# Patient Record
Sex: FEMALE | Race: WHITE | NOT HISPANIC OR LATINO | Employment: FULL TIME | URBAN - METROPOLITAN AREA
[De-identification: names, ages, dates, MRNs, and addresses within clinical notes are randomized per-mention and may not be internally consistent; named-entity substitution may affect disease eponyms.]

---

## 2024-03-04 NOTE — PROGRESS NOTES
Lani Musa female   1940 83 y.o.   [id number]      Chief Complaint  High risk surveillance care, annual mammogram and exam    History Of Present Illness  Sister Lani Musa is a very pleasant 83 year old  woman [facility]. 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 She has additional remote history of multiple breast biopsies, benign. She declined endocrine therapy. She presents today for annual mammogram and exam. She denies any new masses or lumps. She is currently on 4L of oxygen via Nasal Canula due to pulmonary fibrosis and blocked cardiac valve. She states she has to remain on the oxygen indefinitely.      BREAST IMAGING: 3/7/2023 Bilateral screening mammogram, indicates BI-RADS Category 2.      FEMALE HISTORY: menarche age 12, nulliparous, no OCP's, natural menopause age 47, no HRT, scattered fibroglandular tissue     FAMILY CANCER HISTORY:  None      Surgical History  She has a past surgical history that includes Back surgery (09/05/2013); Colonoscopy (09/05/2013); Tonsillectomy (09/05/2013); Laparoscopy diagnostic / biopsy / aspiration / lysis (09/05/2013); and Breast biopsy (03/11/2014).     Social History  She reports that she has never smoked. She has never used smokeless tobacco. No history on file for alcohol use and drug use.    Family History  No family history on file.     Allergies  Aspirin, Nsaids (non-steroidal anti-inflammatory drug), Pyrazolones, and Salicylates    Medications  Current Outpatient Medications   Medication Instructions    acetaminophen 500 mg powder in packet 2 tablets, oral, Nightly    cholecalciferol (VITAMIN D-3) 1,000 Units, oral, Daily RT    cyanocobalamin (VITAMIN B-12) 1,000 mcg, oral, Daily    digoxin (LANOXIN) 125 mcg, oral, Daily     empagliflozin (Jardiance) 10 mg 1 tablet, oral, Daily with breakfast    ibandronate (BONIVA) 150 mg, oral, Every 30 days    ipratropium (Atrovent) 21 mcg (0.03 %) nasal spray 2 sprays, nasal, 3 times daily    lisinopril 5 mg, oral, Daily RT    metoprolol tartrate (LOPRESSOR) 50 mg, oral, 2 times daily    risedronate (ACTONEL) 150 mg, oral    rivaroxaban (XARELTO) 20 mg, oral, Daily with evening meal    thiamine (VITAMIN B-1) 100 mg, oral, Daily RT    Tyvaso DPI 64 mcg, inhalation, 4 times daily         REVIEW OF SYSTEMS    Constitutional:  Negative for appetite change, fatigue, fever and unexpected weight change.   HENT:  Negative for ear pain, hearing loss, nosebleeds, sore throat and trouble swallowing.    Eyes:  Negative for discharge, itching and visual disturbance.   Respiratory:  Negative for cough, chest tightness and shortness of breath.    Cardiovascular:  Negative for chest pain, palpitations and leg swelling.   Breast: as indicated in HPI  Gastrointestinal:  Negative for abdominal pain, constipation, diarrhea and nausea.   Endocrine: Negative for cold intolerance and heat intolerance.   Genitourinary:  Negative for dysuria, frequency, hematuria, pelvic pain and vaginal bleeding.   Musculoskeletal:  Negative for arthralgias, back pain, gait problem, joint swelling and myalgias.   Skin:  Negative for color change and rash.   Allergic/Immunologic: Negative for environmental allergies and food allergies.   Neurological:  Negative for dizziness, tremors, speech difficulty, weakness, numbness and headaches.   Hematological:  Does not bruise/bleed easily.   Psychiatric/Behavioral:  Negative for agitation, dysphoric mood and sleep disturbance. The patient is not nervous/anxious.         Past Medical History  She has a past medical history of Personal history of in-situ neoplasm of breast (10/23/2015) and Personal history of other diseases of the circulatory system.     Physical Exam  Patient is alert and oriented  x3 and in a relaxed and appropriate mood. Her gait is steady and hand grasps are equal. Sclera is clear. The breasts are nearly symmetrical with ptosis. The tissue is soft without palpable abnormalities, discrete nodules or masses. Both breasts have well-healed excisional biopsy incisions. The skin is dry and flaky. The nipples appear normal. There is no supraclavicular or axillary lymphadenopathy. I am able to palpate a left sided hard cervical lymph node. Heart rate and rhythm normal, S1 and S2 appreciated. The lungs are clear to auscultation bilaterally. Abdomen is soft and non-tender.       Physical Exam  Chest:              Last Recorded Vitals  Vitals:    03/12/24 1321   BP: 100/60   Pulse: 77   Resp: 16       Relevant Results   Time was spent discussing digital images of the radiology testing with the patient. I explained the results in depth, along with suggested explanation for follow up recommendations based on the testing results. BI-RADS Category 2    Imaging       Narrative & Impression   Interpreted By:  Cuauhtemoc Ledesma,   STUDY:  BI MAMMO BILATERAL SCREENING TOMOSYNTHESIS;  3/12/2024 1:52 pm      ACCESSION NUMBER(S):  [id number]      ORDERING CLINICIAN:  RAMESH COOK      INDICATION:  Screening. History of prior benign bilateral excisional and core  biopsies.      COMPARISON:  03/07/2023, 03/04/2022, 02/05/2021, and all relevant prior breast  imaging exams available at the time of dictation.      FINDINGS:  2D and tomosynthesis images were reviewed at 1 mm slice thickness.      Density:  There are areas of scattered fibroglandular tissue.      There are areas of prior excisional and core needle biopsies with  associated tissue markers. No suspicious masses or calcifications are  identified.      IMPRESSION:  No mammographic evidence of malignancy.      BI-RADS CATEGORY:      BI-RADS Category:  2 Benign.  Recommendation:  Annual Screening.  Recommended Date:  1 Year.  Laterality:  Bilateral.        Assessment/Plan   High risk surveillance care, normal clinical exam and imaging, history of right LCIS, history of left ALH, declined endocrine therapy, no family history of breast cancer, scattered fibroglandular tissue, left sided hard cervical lymph node     Plan: Prefers to follow with breast specialist annually. Return in one year for bilateral screening mammogram and office visit. Offered ultrasound for cervical lymph node, declined stating she will contact her PCP. Recommend US for further evaluation.     Patient Discussion/Summary  Your clinical examination and imaging are normal. Please return in one year for bilateral screening mammogram and office visit or sooner if you have any problems or concerns. Please follow up with your PCP regarding cervical lymph node finding.     You can see your health information, review clinical summaries from office visits & test results online when you follow your health with MY  Chart, a personal health record. To sign up go to [email or web address]. If you need assistance with signing up or trouble getting into your account call [employer] Patient Line 24/7 at [phone].    My office phone number is [phone] if you need to get in touch with me or have additional questions or concerns. Thank you for choosing [facility] and trusting me as your healthcare provider. I look forward to seeing you again at your next office visit. I am honored to be a provider on your health care team and I remain dedicated to helping you achieve your health goals.      Radha Zhu, APRN-CNP

## 2024-03-07 PROBLEM — R92.0 ABNORMAL FINDING ON MAMMOGRAPHY, MICROCALCIFICATION: Status: ACTIVE | Noted: 2024-03-07

## 2024-03-07 PROBLEM — R92.8 MAMMOGRAM ABNORMAL: Status: ACTIVE | Noted: 2024-03-07

## 2024-03-07 PROBLEM — D05.00 BREAST NEOPLASM, TIS (LCIS): Status: ACTIVE | Noted: 2024-03-07

## 2024-03-07 PROBLEM — N60.01 BREAST CYST, RIGHT: Status: ACTIVE | Noted: 2024-03-07

## 2024-03-07 PROBLEM — N60.99 UNSPECIFIED BENIGN MAMMARY DYSPLASIA OF UNSPECIFIED BREAST: Status: ACTIVE | Noted: 2024-03-07

## 2024-03-07 RX ORDER — METOPROLOL TARTRATE 50 MG/1
50 TABLET ORAL 2 TIMES DAILY
COMMUNITY
Start: 2023-09-20

## 2024-03-07 RX ORDER — LANOLIN ALCOHOL/MO/W.PET/CERES
100 CREAM (GRAM) TOPICAL
COMMUNITY
Start: 2023-08-07

## 2024-03-07 RX ORDER — LANOLIN ALCOHOL/MO/W.PET/CERES
1000 CREAM (GRAM) TOPICAL DAILY
COMMUNITY

## 2024-03-07 RX ORDER — RISEDRONATE SODIUM 150 MG/1
150 TABLET, FILM COATED ORAL
COMMUNITY
Start: 2013-11-15

## 2024-03-07 RX ORDER — CHOLECALCIFEROL (VITAMIN D3) 25 MCG
1000 TABLET ORAL
COMMUNITY
Start: 2023-08-07

## 2024-03-07 RX ORDER — DIGOXIN 125 MCG
125 TABLET ORAL DAILY
COMMUNITY
Start: 2023-08-15

## 2024-03-07 RX ORDER — TREPROSTINIL 64 UG/1
64 INHALANT ORAL 4 TIMES DAILY
COMMUNITY
Start: 2024-01-11

## 2024-03-07 RX ORDER — IBANDRONATE SODIUM 150 MG/1
150 TABLET, FILM COATED ORAL
COMMUNITY
Start: 2022-10-06

## 2024-03-07 RX ORDER — LISINOPRIL 5 MG/1
5 TABLET ORAL
COMMUNITY
Start: 2023-09-20

## 2024-03-12 ENCOUNTER — OFFICE VISIT (OUTPATIENT)
Dept: SURGICAL ONCOLOGY | Facility: HOSPITAL | Age: 84
End: 2024-03-12
Payer: MEDICARE

## 2024-03-12 ENCOUNTER — HOSPITAL ENCOUNTER (OUTPATIENT)
Dept: RADIOLOGY | Facility: HOSPITAL | Age: 84
Discharge: HOME | End: 2024-03-12
Payer: MEDICARE

## 2024-03-12 VITALS
SYSTOLIC BLOOD PRESSURE: 100 MMHG | RESPIRATION RATE: 16 BRPM | WEIGHT: 103 LBS | BODY MASS INDEX: 18.25 KG/M2 | HEIGHT: 63 IN | DIASTOLIC BLOOD PRESSURE: 60 MMHG | HEART RATE: 77 BPM

## 2024-03-12 VITALS — HEIGHT: 63 IN | BODY MASS INDEX: 18.25 KG/M2 | WEIGHT: 103 LBS

## 2024-03-12 DIAGNOSIS — Z12.31 ENCOUNTER FOR SCREENING MAMMOGRAM FOR MALIGNANT NEOPLASM OF BREAST: ICD-10-CM

## 2024-03-12 DIAGNOSIS — Z12.39 ENCOUNTER FOR OTHER SCREENING FOR MALIGNANT NEOPLASM OF BREAST: ICD-10-CM

## 2024-03-12 DIAGNOSIS — Z12.39 BREAST CANCER SCREENING, HIGH RISK PATIENT: Primary | ICD-10-CM

## 2024-03-12 PROCEDURE — 77067 SCR MAMMO BI INCL CAD: CPT

## 2024-03-12 PROCEDURE — 1036F TOBACCO NON-USER: CPT | Performed by: NURSE PRACTITIONER

## 2024-03-12 PROCEDURE — 77063 BREAST TOMOSYNTHESIS BI: CPT | Mod: BILATERAL PROCEDURE | Performed by: STUDENT IN AN ORGANIZED HEALTH CARE EDUCATION/TRAINING PROGRAM

## 2024-03-12 PROCEDURE — 77067 SCR MAMMO BI INCL CAD: CPT | Mod: BILATERAL PROCEDURE | Performed by: STUDENT IN AN ORGANIZED HEALTH CARE EDUCATION/TRAINING PROGRAM

## 2024-03-12 PROCEDURE — 99213 OFFICE O/P EST LOW 20 MIN: CPT | Performed by: NURSE PRACTITIONER

## 2024-03-12 PROCEDURE — 1159F MED LIST DOCD IN RCRD: CPT | Performed by: NURSE PRACTITIONER

## 2024-03-12 RX ORDER — IPRATROPIUM BROMIDE 21 UG/1
2 SPRAY, METERED NASAL 3 TIMES DAILY
COMMUNITY
Start: 2023-03-09

## 2024-03-12 NOTE — PATIENT INSTRUCTIONS
Your clinical examination and imaging are normal. Please return in one year for bilateral screening mammogram and office visit or sooner if you have any problems or concerns. Please follow up with your PCP regarding cervical lymph node finding.     You can see your health information, review clinical summaries from office visits & test results online when you follow your health with MY  Chart, a personal health record. To sign up go to [email or web address]. If you need assistance with signing up or trouble getting into your account call [employer] Patient Line 24/7 at [phone].    My office phone number is [phone] if you need to get in touch with me or have additional questions or concerns. Thank you for choosing [facility] and trusting me as your healthcare provider. I look forward to seeing you again at your next office visit. I am honored to be a provider on your health care team and I remain dedicated to helping you achieve your health goals.

## 2025-03-18 ENCOUNTER — APPOINTMENT (OUTPATIENT)
Dept: SURGICAL ONCOLOGY | Facility: HOSPITAL | Age: 85
End: 2025-03-18
Payer: MEDICARE

## 2025-03-18 ENCOUNTER — APPOINTMENT (OUTPATIENT)
Dept: RADIOLOGY | Facility: HOSPITAL | Age: 85
End: 2025-03-18
Payer: MEDICARE

## 2025-03-25 NOTE — PROGRESS NOTES
Lani Musa female   1940 85 y.o.   [id number]      Chief Complaint  High risk surveillance care, annual mammogram and exam    History Of Present Illness  Sister Lani Musa is a very pleasant 85 year old  woman [facility]. 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 She has additional remote history of multiple breast biopsies, benign. She declined endocrine therapy. She presents today for annual mammogram and exam. She denies any new masses or lumps. She is currently on 3L of oxygen via Nasal Canula due to pulmonary fibrosis and blocked cardiac valve. She states she has to remain on the oxygen indefinitely.      BREAST IMAGING: 3/12/2024 Bilateral screening mammogram, indicates BI-RADS Category 2.      FEMALE HISTORY: menarche age 12, nulliparous, no OCP's, natural menopause age 47, no HRT, scattered fibroglandular tissue     FAMILY CANCER HISTORY:  None      Surgical History  She has a past surgical history that includes Back surgery (09/05/2013); Colonoscopy (09/05/2013); Tonsillectomy (09/05/2013); Laparoscopy diagnostic / biopsy / aspiration / lysis (09/05/2013); Breast biopsy (03/11/2014); Breast cyst aspiration (maybe 2018); and Breast cyst excision (maybe 2010).     Social History  She reports that she has never smoked. She has never used smokeless tobacco. No history on file for alcohol use and drug use.    Family History  No family history on file.     Allergies  Aspirin, Nsaids (non-steroidal anti-inflammatory drug), Pyrazolones, and Salicylates    Medications  Current Outpatient Medications   Medication Instructions    acetaminophen 500 mg powder in packet 2 tablets, Nightly    cholecalciferol (VITAMIN D-3) 1,000 Units, Daily RT    cyanocobalamin (VITAMIN B-12) 1,000  mcg, Daily    digoxin (LANOXIN) 125 mcg, Daily    empagliflozin (Jardiance) 10 mg 1 tablet, oral, Daily with breakfast    ibandronate (BONIVA) 150 mg, Every 30 days    ipratropium (Atrovent) 21 mcg (0.03 %) nasal spray 2 sprays, 3 times daily    lisinopril 5 mg, Daily RT    metoprolol tartrate (LOPRESSOR) 50 mg, 2 times daily    risedronate (ACTONEL) 150 mg    rivaroxaban (XARELTO) 20 mg, Daily with evening meal    thiamine (VITAMIN B-1) 100 mg, Daily RT         REVIEW OF SYSTEMS    Constitutional:  Negative for appetite change, fatigue, fever and unexpected weight change.   HENT:  Negative for ear pain, hearing loss, nosebleeds, sore throat and trouble swallowing.    Eyes:  Negative for discharge, itching and visual disturbance.   Respiratory:  Negative for cough, chest tightness and shortness of breath.    Cardiovascular:  Negative for chest pain, palpitations and leg swelling.   Breast: as indicated in HPI  Gastrointestinal:  Negative for abdominal pain, constipation, diarrhea and nausea.   Endocrine: Negative for cold intolerance and heat intolerance.   Genitourinary:  Negative for dysuria, frequency, hematuria, pelvic pain and vaginal bleeding.   Musculoskeletal:  Negative for arthralgias, back pain, gait problem, joint swelling and myalgias.   Skin:  Negative for color change and rash.   Allergic/Immunologic: Negative for environmental allergies and food allergies.   Neurological:  Negative for dizziness, tremors, speech difficulty, weakness, numbness and headaches.   Hematological:  Does not bruise/bleed easily.   Psychiatric/Behavioral:  Negative for agitation, dysphoric mood and sleep disturbance. The patient is not nervous/anxious.         Past Medical History  She has a past medical history of Breast cyst (maybe 2018), Delayed emergence from general anesthesia (not sure  happened twice), Lobular carcinoma in situ (maybe 2018), Personal history of in-situ neoplasm of breast (10/23/2015), Personal history  of other diseases of the circulatory system, and Pulmonary arterial hypertension (Multi) (2022).     Physical Exam  Patient is alert and oriented x3 and in a relaxed and appropriate mood. Her gait is steady and hand grasps are equal. Sclera is clear. She has nasal cannula in place. The breasts are nearly symmetrical with significant ptosis. The tissue is dense without palpable abnormalities, discrete nodules or masses. Both breasts have well-healed excisional biopsy incisions. The skin is dry and flaky. The nipples appear normal. There is no supraclavicular or axillary lymphadenopathy. I am still able to palpate a left sided moderately firm cervical lymph node, appears decreased in size since previous exam. There is also a small soft palpable lymph node right back of neck just under hair line. Heart rate and rhythm abnormal, S1 and S2 appreciated with murmur present. The lungs have crackles bilaterally.     Physical Exam  Chest:              Last Recorded Vitals  Vitals:    04/08/25 0806   BP: 98/51   Pulse: 76   Resp: 16         Relevant Results   Time was spent discussing digital images of the radiology testing with the patient, results pending    Assessment/Plan   High risk surveillance care, normal clinical exam, screening mammogram, history of right LCIS, history of left ALH, declined endocrine therapy, no family history of breast cancer, scattered fibroglandular tissue     Plan: Prefers to follow with breast specialist annually. Return in one year for bilateral screening mammogram and office visit.     Patient Discussion/Summary  Your clinical examination is normal. Please return in one year for bilateral screening mammogram and office visit or sooner if you have any problems or concerns.     You can see your health information, review clinical summaries from office visits & test results online when you follow your health with MY  Chart, a personal health record. To sign up go to [email or web address].  If you need assistance with signing up or trouble getting into your account call [employer] Patient Line 24/7 at [phone].    My office phone number is [phone] if you need to get in touch with me or have additional questions or concerns. Thank you for choosing [facility] and trusting me as your healthcare provider. I look forward to seeing you again at your next office visit. I am honored to be a provider on your health care team and I remain dedicated to helping you achieve your health goals.      Radha Zhu, APRN-CNP

## 2025-04-04 ENCOUNTER — APPOINTMENT (OUTPATIENT)
Dept: RADIOLOGY | Facility: HOSPITAL | Age: 85
End: 2025-04-04
Payer: MEDICARE

## 2025-04-08 ENCOUNTER — HOSPITAL ENCOUNTER (OUTPATIENT)
Dept: RADIOLOGY | Facility: HOSPITAL | Age: 85
Discharge: HOME | End: 2025-04-08
Payer: MEDICARE

## 2025-04-08 ENCOUNTER — OFFICE VISIT (OUTPATIENT)
Dept: SURGICAL ONCOLOGY | Facility: HOSPITAL | Age: 85
End: 2025-04-08
Payer: MEDICARE

## 2025-04-08 VITALS — HEIGHT: 64 IN | BODY MASS INDEX: 16.22 KG/M2 | WEIGHT: 95 LBS

## 2025-04-08 VITALS
DIASTOLIC BLOOD PRESSURE: 51 MMHG | SYSTOLIC BLOOD PRESSURE: 98 MMHG | BODY MASS INDEX: 16.22 KG/M2 | RESPIRATION RATE: 16 BRPM | WEIGHT: 95 LBS | HEIGHT: 64 IN | HEART RATE: 76 BPM

## 2025-04-08 DIAGNOSIS — Z12.39 BREAST CANCER SCREENING, HIGH RISK PATIENT: ICD-10-CM

## 2025-04-08 DIAGNOSIS — Z12.31 ENCOUNTER FOR SCREENING MAMMOGRAM FOR MALIGNANT NEOPLASM OF BREAST: ICD-10-CM

## 2025-04-08 PROCEDURE — 1159F MED LIST DOCD IN RCRD: CPT | Performed by: NURSE PRACTITIONER

## 2025-04-08 PROCEDURE — 77067 SCR MAMMO BI INCL CAD: CPT | Performed by: RADIOLOGY

## 2025-04-08 PROCEDURE — 77063 BREAST TOMOSYNTHESIS BI: CPT | Performed by: RADIOLOGY

## 2025-04-08 PROCEDURE — 99213 OFFICE O/P EST LOW 20 MIN: CPT | Performed by: NURSE PRACTITIONER

## 2025-04-08 PROCEDURE — 77063 BREAST TOMOSYNTHESIS BI: CPT

## 2025-04-08 PROCEDURE — 1036F TOBACCO NON-USER: CPT | Performed by: NURSE PRACTITIONER

## 2025-04-08 NOTE — PATIENT INSTRUCTIONS
Your clinical examination is normal. Please return in one year for bilateral screening mammogram and office visit or sooner if you have any problems or concerns.     You can see your health information, review clinical summaries from office visits & test results online when you follow your health with MY  Chart, a personal health record. To sign up go to [email or web address]. If you need assistance with signing up or trouble getting into your account call [employer] Patient Line 24/7 at [phone].    My office phone number is [phone] if you need to get in touch with me or have additional questions or concerns. Thank you for choosing [facility] and trusting me as your healthcare provider. I look forward to seeing you again at your next office visit. I am honored to be a provider on your health care team and I remain dedicated to helping you achieve your health goals.